# Patient Record
Sex: FEMALE | Race: ASIAN | NOT HISPANIC OR LATINO | URBAN - METROPOLITAN AREA
[De-identification: names, ages, dates, MRNs, and addresses within clinical notes are randomized per-mention and may not be internally consistent; named-entity substitution may affect disease eponyms.]

---

## 2023-06-27 ENCOUNTER — EMERGENCY (EMERGENCY)
Facility: HOSPITAL | Age: 43
LOS: 1 days | Discharge: HOME | End: 2023-06-27
Attending: STUDENT IN AN ORGANIZED HEALTH CARE EDUCATION/TRAINING PROGRAM
Payer: COMMERCIAL

## 2023-06-27 VITALS
SYSTOLIC BLOOD PRESSURE: 124 MMHG | RESPIRATION RATE: 20 BRPM | OXYGEN SATURATION: 98 % | HEART RATE: 83 BPM | TEMPERATURE: 98 F | DIASTOLIC BLOOD PRESSURE: 79 MMHG | WEIGHT: 203.93 LBS | HEIGHT: 63 IN

## 2023-06-27 VITALS
RESPIRATION RATE: 18 BRPM | OXYGEN SATURATION: 100 % | HEART RATE: 78 BPM | DIASTOLIC BLOOD PRESSURE: 73 MMHG | SYSTOLIC BLOOD PRESSURE: 129 MMHG | TEMPERATURE: 98 F

## 2023-06-27 LAB
ALBUMIN SERPL ELPH-MCNC: 4.1 G/DL — SIGNIFICANT CHANGE UP (ref 3.3–5)
ALP SERPL-CCNC: 43 U/L — SIGNIFICANT CHANGE UP (ref 40–120)
ALT FLD-CCNC: 10 U/L — SIGNIFICANT CHANGE UP (ref 10–45)
ANION GAP SERPL CALC-SCNC: 14 MMOL/L — SIGNIFICANT CHANGE UP (ref 5–17)
AST SERPL-CCNC: 12 U/L — SIGNIFICANT CHANGE UP (ref 10–40)
BASE EXCESS BLDV CALC-SCNC: -2.2 MMOL/L — LOW (ref -2–3)
BASOPHILS # BLD AUTO: 0.02 K/UL — SIGNIFICANT CHANGE UP (ref 0–0.2)
BASOPHILS NFR BLD AUTO: 0.2 % — SIGNIFICANT CHANGE UP (ref 0–2)
BILIRUB SERPL-MCNC: 0.3 MG/DL — SIGNIFICANT CHANGE UP (ref 0.2–1.2)
BUN SERPL-MCNC: 6 MG/DL — LOW (ref 7–23)
CA-I SERPL-SCNC: 1.32 MMOL/L — SIGNIFICANT CHANGE UP (ref 1.15–1.33)
CALCIUM SERPL-MCNC: 9.7 MG/DL — SIGNIFICANT CHANGE UP (ref 8.4–10.5)
CHLORIDE BLDV-SCNC: 105 MMOL/L — SIGNIFICANT CHANGE UP (ref 96–108)
CHLORIDE SERPL-SCNC: 104 MMOL/L — SIGNIFICANT CHANGE UP (ref 96–108)
CO2 BLDV-SCNC: 24 MMOL/L — SIGNIFICANT CHANGE UP (ref 22–26)
CO2 SERPL-SCNC: 20 MMOL/L — LOW (ref 22–31)
CREAT SERPL-MCNC: 0.4 MG/DL — LOW (ref 0.5–1.3)
EGFR: 126 ML/MIN/1.73M2 — SIGNIFICANT CHANGE UP
EOSINOPHIL # BLD AUTO: 0.04 K/UL — SIGNIFICANT CHANGE UP (ref 0–0.5)
EOSINOPHIL NFR BLD AUTO: 0.4 % — SIGNIFICANT CHANGE UP (ref 0–6)
GAS PNL BLDV: 135 MMOL/L — LOW (ref 136–145)
GAS PNL BLDV: SIGNIFICANT CHANGE UP
GLUCOSE BLDV-MCNC: 95 MG/DL — SIGNIFICANT CHANGE UP (ref 70–99)
GLUCOSE SERPL-MCNC: 96 MG/DL — SIGNIFICANT CHANGE UP (ref 70–99)
HCO3 BLDV-SCNC: 23 MMOL/L — SIGNIFICANT CHANGE UP (ref 22–29)
HCT VFR BLD CALC: 34.7 % — SIGNIFICANT CHANGE UP (ref 34.5–45)
HCT VFR BLDA CALC: 36 % — SIGNIFICANT CHANGE UP (ref 34.5–46.5)
HGB BLD CALC-MCNC: 12 G/DL — SIGNIFICANT CHANGE UP (ref 11.7–16.1)
HGB BLD-MCNC: 11.6 G/DL — SIGNIFICANT CHANGE UP (ref 11.5–15.5)
IMM GRANULOCYTES NFR BLD AUTO: 0.5 % — SIGNIFICANT CHANGE UP (ref 0–0.9)
LACTATE BLDV-MCNC: 1.7 MMOL/L — SIGNIFICANT CHANGE UP (ref 0.5–2)
LIDOCAIN IGE QN: 13 U/L — SIGNIFICANT CHANGE UP (ref 7–60)
LYMPHOCYTES # BLD AUTO: 1.94 K/UL — SIGNIFICANT CHANGE UP (ref 1–3.3)
LYMPHOCYTES # BLD AUTO: 21 % — SIGNIFICANT CHANGE UP (ref 13–44)
MCHC RBC-ENTMCNC: 30.8 PG — SIGNIFICANT CHANGE UP (ref 27–34)
MCHC RBC-ENTMCNC: 33.4 GM/DL — SIGNIFICANT CHANGE UP (ref 32–36)
MCV RBC AUTO: 92 FL — SIGNIFICANT CHANGE UP (ref 80–100)
MONOCYTES # BLD AUTO: 0.5 K/UL — SIGNIFICANT CHANGE UP (ref 0–0.9)
MONOCYTES NFR BLD AUTO: 5.4 % — SIGNIFICANT CHANGE UP (ref 2–14)
NEUTROPHILS # BLD AUTO: 6.67 K/UL — SIGNIFICANT CHANGE UP (ref 1.8–7.4)
NEUTROPHILS NFR BLD AUTO: 72.5 % — SIGNIFICANT CHANGE UP (ref 43–77)
NRBC # BLD: 0 /100 WBCS — SIGNIFICANT CHANGE UP (ref 0–0)
PCO2 BLDV: 41 MMHG — SIGNIFICANT CHANGE UP (ref 39–42)
PH BLDV: 7.36 — SIGNIFICANT CHANGE UP (ref 7.32–7.43)
PLATELET # BLD AUTO: 261 K/UL — SIGNIFICANT CHANGE UP (ref 150–400)
PO2 BLDV: 30 MMHG — SIGNIFICANT CHANGE UP (ref 25–45)
POTASSIUM BLDV-SCNC: 3.9 MMOL/L — SIGNIFICANT CHANGE UP (ref 3.5–5.1)
POTASSIUM SERPL-MCNC: 3.9 MMOL/L — SIGNIFICANT CHANGE UP (ref 3.5–5.3)
POTASSIUM SERPL-SCNC: 3.9 MMOL/L — SIGNIFICANT CHANGE UP (ref 3.5–5.3)
PROT SERPL-MCNC: 6.7 G/DL — SIGNIFICANT CHANGE UP (ref 6–8.3)
RBC # BLD: 3.77 M/UL — LOW (ref 3.8–5.2)
RBC # FLD: 13.2 % — SIGNIFICANT CHANGE UP (ref 10.3–14.5)
SAO2 % BLDV: 42.3 % — LOW (ref 67–88)
SODIUM SERPL-SCNC: 138 MMOL/L — SIGNIFICANT CHANGE UP (ref 135–145)
TROPONIN T, HIGH SENSITIVITY RESULT: <6 NG/L — SIGNIFICANT CHANGE UP (ref 0–51)
WBC # BLD: 9.22 K/UL — SIGNIFICANT CHANGE UP (ref 3.8–10.5)
WBC # FLD AUTO: 9.22 K/UL — SIGNIFICANT CHANGE UP (ref 3.8–10.5)

## 2023-06-27 PROCEDURE — 84132 ASSAY OF SERUM POTASSIUM: CPT

## 2023-06-27 PROCEDURE — 99285 EMERGENCY DEPT VISIT HI MDM: CPT | Mod: 25

## 2023-06-27 PROCEDURE — 85018 HEMOGLOBIN: CPT

## 2023-06-27 PROCEDURE — 80053 COMPREHEN METABOLIC PANEL: CPT

## 2023-06-27 PROCEDURE — 85014 HEMATOCRIT: CPT

## 2023-06-27 PROCEDURE — 82435 ASSAY OF BLOOD CHLORIDE: CPT

## 2023-06-27 PROCEDURE — 96374 THER/PROPH/DIAG INJ IV PUSH: CPT

## 2023-06-27 PROCEDURE — 82330 ASSAY OF CALCIUM: CPT

## 2023-06-27 PROCEDURE — 85025 COMPLETE CBC W/AUTO DIFF WBC: CPT

## 2023-06-27 PROCEDURE — 82803 BLOOD GASES ANY COMBINATION: CPT

## 2023-06-27 PROCEDURE — 71045 X-RAY EXAM CHEST 1 VIEW: CPT | Mod: 26

## 2023-06-27 PROCEDURE — 71045 X-RAY EXAM CHEST 1 VIEW: CPT

## 2023-06-27 PROCEDURE — 76705 ECHO EXAM OF ABDOMEN: CPT | Mod: 26

## 2023-06-27 PROCEDURE — 76705 ECHO EXAM OF ABDOMEN: CPT

## 2023-06-27 PROCEDURE — 82947 ASSAY GLUCOSE BLOOD QUANT: CPT

## 2023-06-27 PROCEDURE — 83605 ASSAY OF LACTIC ACID: CPT

## 2023-06-27 PROCEDURE — 84484 ASSAY OF TROPONIN QUANT: CPT

## 2023-06-27 PROCEDURE — 84295 ASSAY OF SERUM SODIUM: CPT

## 2023-06-27 PROCEDURE — 96375 TX/PRO/DX INJ NEW DRUG ADDON: CPT

## 2023-06-27 PROCEDURE — 93005 ELECTROCARDIOGRAM TRACING: CPT

## 2023-06-27 PROCEDURE — 99285 EMERGENCY DEPT VISIT HI MDM: CPT

## 2023-06-27 PROCEDURE — 83690 ASSAY OF LIPASE: CPT

## 2023-06-27 RX ORDER — SODIUM CHLORIDE 9 MG/ML
1000 INJECTION INTRAMUSCULAR; INTRAVENOUS; SUBCUTANEOUS ONCE
Refills: 0 | Status: COMPLETED | OUTPATIENT
Start: 2023-06-27 | End: 2023-06-27

## 2023-06-27 RX ORDER — FAMOTIDINE 10 MG/ML
20 INJECTION INTRAVENOUS ONCE
Refills: 0 | Status: COMPLETED | OUTPATIENT
Start: 2023-06-27 | End: 2023-06-27

## 2023-06-27 RX ORDER — ACETAMINOPHEN 500 MG
1000 TABLET ORAL ONCE
Refills: 0 | Status: COMPLETED | OUTPATIENT
Start: 2023-06-27 | End: 2023-06-27

## 2023-06-27 RX ORDER — LIDOCAINE 4 G/100G
10 CREAM TOPICAL ONCE
Refills: 0 | Status: COMPLETED | OUTPATIENT
Start: 2023-06-27 | End: 2023-06-27

## 2023-06-27 RX ADMIN — SODIUM CHLORIDE 1000 MILLILITER(S): 9 INJECTION INTRAMUSCULAR; INTRAVENOUS; SUBCUTANEOUS at 22:14

## 2023-06-27 RX ADMIN — LIDOCAINE 10 MILLILITER(S): 4 CREAM TOPICAL at 23:10

## 2023-06-27 RX ADMIN — FAMOTIDINE 20 MILLIGRAM(S): 10 INJECTION INTRAVENOUS at 22:10

## 2023-06-27 RX ADMIN — Medication 30 MILLILITER(S): at 22:10

## 2023-06-27 RX ADMIN — Medication 400 MILLIGRAM(S): at 23:46

## 2023-06-27 NOTE — ED PROVIDER NOTE - RAPID ASSESSMENT
MD Fxo:  patient seen in triage/quick-look encounter to expedite medical workup.  H&P is NOT complete, and will defer final HPI, testing, workup, and MDM to treating team in main ED.    42-year-old female 12 weeks pregnant complaining of 3 days intermittent substernal chest pain.  Denies associated shortness of breath, + subjective fevers.  No dysuria no flank pain no nausea no vomiting.  Medical history significant for diabetes and hypertension.  VS: wnl.  Gen: Adult female, uncomfortable appearing..  Head:  NC/AT.  Resp: No distress.  Ext: no deformities.  Skin: warm and dry as visualized.  Neuro: alert and oriented to person, place, time.  Abdomen: Soft nondistended, zero epigastric right upper quadrant tenderness to palpation  Medical decision making: Differential diagnosis at this time includes ACS, hepatobiliary disease.  Suspicion for pulmonary embolism is low at this time.  ddimer not indicated at this time.   ECG directly visualized by me shows normal sinus rhythm rate 78, , QRS 76, QTc 424 no ST elevations or depressions

## 2023-06-27 NOTE — ED PROVIDER NOTE - NSFOLLOWUPINSTRUCTIONS_ED_ALL_ED_FT
Abdominal Pain    Many things can cause abdominal pain. Many times, abdominal pain is not caused by a disease and will improve without treatment. Your health care provider will do a physical exam to determine if there is a dangerous cause of your pain; blood tests and imaging may help determine the cause of your pain. However, in many cases, no cause may be found and you may need further testing as an outpatient. Monitor your abdominal pain for any changes.     SEEK IMMEDIATE MEDICAL CARE IF YOU HAVE ANY OF THE FOLLOWING SYMPTOMS: worsening abdominal pain, uncontrollable vomiting, profuse diarrhea, inability to have bowel movements or pass gas, black or bloody stools, fever accompanying chest pain or back pain, or fainting. These symptoms may represent a serious problem that is an emergency. Do not wait to see if the symptoms will go away. Get medical help right away. Call 911 and do not drive yourself to the hospital.    GERD (Gastroesophageal Reflux Disease)    WHAT YOU NEED TO KNOW:    Gastroesophageal reflux disease (GERD) is reflux that happens more than 2 times a week for a few weeks. Reflux means acid and food in your stomach back up into your esophagus. GERD can cause other health problems over time if it is not treated.    Take omeprazole 20 mg once a day before dinner, take this medication for at least 14 days to trial its efficacy.    Take Maalox 10-20ml up to 4 times a day for severe pain.    Return to the emergency department if:   •You have trouble breathing after you vomit.  •You have trouble swallowing, or pain with swallowing.  •Your bowel movements are black, bloody, or tarry-looking.  •Your vomit looks like coffee grounds or has blood in it.    Call your doctor or gastroenterologist if:   •You feel full and cannot burp or vomit.  •You vomit large amounts, or you vomit often.  •You are losing weight without trying.  •Your symptoms get worse or do not improve with treatment.  •You have questions or concerns about your condition or care.    Medicines:   •Medicines are used to decrease stomach acid.  •Take your medicine as directed. Contact your healthcare provider if you think your medicine is not helping or if you have side effects. Tell him of her if you are allergic to any medicine. Keep a list of the medicines, vitamins, and herbs you take. Include the amounts, and when and why you take them. Bring the list or the pill bottles to follow-up visits. Carry your medicine list with you in case of an emergency.    Do not have foods or drinks that may increase heartburn. These include chocolate, peppermint, fried or fatty foods, drinks that contain caffeine, or carbonated drinks (soda). Other foods include spicy foods, onions, tomatoes, and tomato-based foods. Do not have foods or drinks that can irritate your esophagus, such as citrus fruits, juices, and alcohol.  •Do not eat large meals. When you eat a lot of food at one time, your stomach needs more acid to digest it. Eat 6 small meals each day instead of 3 large ones, and eat slowly. Do not eat meals 2 to 3 hours before bedtime.  •Elevate the head of your bed. You may also use more than one pillow under your head and shoulders while you sleep.  •Maintain a healthy weight. If you are overweight, weight loss may help relieve symptoms of GERD.      •Do not smoke. Smoking weakens the lower esophageal sphincter and increases the risk of GERD. Ask your healthcare provider for information if you currently smoke and need help to quit. E-cigarettes or smokeless tobacco still contain nicotine. Talk to your healthcare provider before you use these products.    •Do not put pressure on your abdomen. Pressure pushes acid up into your esophagus. Do not wear clothing that is tight around your waist. Do not bend over. Bend at the knees if you need to pick something up.    Follow up with your doctor or gastroenterologist as directed: Write down your questions so you remember to ask them during your visits.    Followup with your primary gynecologist within the next week.

## 2023-06-27 NOTE — ED ADULT NURSE NOTE - OBJECTIVE STATEMENT
Pt 41 y/o female, AxOX3, presents to ED from home complaining of sharp, epigastric pain x 3 days. PMH of HTN, DM, 12 weeks pregnant. Pt reporting pain is worse after eating. Pt is well appearing, speaking full sentences without difficulty. Breathing spontaneous and unlabored. Upon assessment, abdomen soft and nontender, +strong peripheral pulses, moving all extremities without difficulty, lungs clear. Pt placed on continuous pulse ox and cardiac monitor, NSR noted. Safety and comfort measures initiated- bed placed in lowest position and side rails raised. Pt oriented to call bell system.

## 2023-06-27 NOTE — ED PROVIDER NOTE - PHYSICAL EXAMINATION
A&Ox3, NAD  Lungs CTAB  Cardiac  RRR,   Abd soft, NT/ND, +BS, no rebound or guarding.   Extremities: cap refill <2, pulses in distal extremities 4+, no edema.   Skin without rash.   No focal Deficits, Gait steady.

## 2023-06-27 NOTE — ED ADULT TRIAGE NOTE - CHIEF COMPLAINT QUOTE
Pt c/o "chest pain radiating to back x few weeks. Worsened since yesterday. It comes every few days. 12 wks pregnant"

## 2023-06-27 NOTE — ED PROVIDER NOTE - OBJECTIVE STATEMENT
41 yo f with htn, DM, 12 weeks pregnant here with sharp epigastric pain radiating to R shoulder for the past 2-3 days. Pain worse after PO intake and after burping. Pt states pain was especially bad after eating lunch this afternoon, she took Mylanta and famotidine without improvement, she called her OB who recommended she be evaluated in the ED and evaluate her gallbladder. Pt denies sob or difficulty breathing, no leg pain or swelling, no hx od DVT/PE, no recent travelling. No fevers, no abdominal pain, no vaginal bleeding, no vomiting, diarrhea.

## 2023-06-27 NOTE — ED PROVIDER NOTE - PATIENT PORTAL LINK FT
You can access the FollowMyHealth Patient Portal offered by [facility] by registering at the following website: [email or web address]. By joining [facility]’s FollowMyHealth portal, you will also be able to view your health information using other applications (apps) compatible with our system.

## 2023-06-27 NOTE — ED PROVIDER NOTE - CLINICAL SUMMARY MEDICAL DECISION MAKING FREE TEXT BOX
Theo Dacosta MD. 41 yo f with htn, DM, 12 weeks pregnant here with sharp epigastric pain radiating to R shoulder for the past 2-3 days. Pain worse after PO intake and after burping. Pt states pain was especially bad after eating lunch this afternoon, she took Mylanta and famotidine without improvement, she called her OB who recommended she be evaluated in the ED and evaluate her gallbladder. Pt denies sob or difficulty breathing, no leg pain or swelling, no hx od DVT/PE, no recent travelling. No fevers, no abdominal pain, no vaginal bleeding, no vomiting, diarrhea.    VSS. general: in no acute distress. normocephalic, atrumatic head. neck supple. no jvd. rrr nl s1/s2, no m/r/g. lungs cta. abd soft non distended. mild ttp to epigastric region. no ruq ttp. no hubbard's sign. b/l le no edema or ttp. palpable radial and dp/pt pulses, equal. A&ox3. neurologically intact, no focal deficits. skin warm and dry.    mdm: ddx includes but not limited to pancreatitis, gastritis/GERD, biliary colic. lower suspicion for acute ACS or PE. lower suspicion for acute cholecystitis. no lower abd ttp to indicate appy.  on POCUS. Will check CBC to eval WBC, anemia, plt count; CMP to eval for lyte abnormalities, renal and/or liver dysfunction. lipase. gi cocktail. willl obtain RUQ sono to eval the above. will reassess. dispo pending ED w/u

## 2023-06-27 NOTE — ED ADULT TRIAGE NOTE - WILL THE PATIENT ACCEPT THE PFIZER COVID-19 VACCINE IF ELIGIBLE AND IT IS AVAILABLE?
Physical Therapy Daily Progress Note    Patient: Roderick Mcdonnell   : 1960  Diagnosis/ICD-10 Code:  Status post total left knee replacement [Z96.652]  Referring practitioner: Servando Gee MD  Date of Initial Visit: Type: THERAPY  Noted: 3/11/2020  Today's Date: 4/3/2020  Patient seen for 8 sessions           Subjective I walked for 30 min and forgot my cane at home     Objective   See Exercise, Manual, and Modality Logs for complete treatment.       Assessment/Plan  Left knee AROM after exercise  degrees and PROM 6-107 degrees is improved yet stiff in both directions. He does compensate stiffness with increased hip and trunk AROM.  He lacks quadriceps strength to stand from chair without hand support, is able to now step up for regular height of step but not down.            Timed:    Manual Therapy:    0     mins  74894;  Therapeutic Exercise:    50     mins  35931;     Neuromuscular Talia:    0    mins  94836;    Therapeutic Activity:     0     mins  97571;     Gait Trainin     mins  21744;     Ultrasound:     0     mins  10643;    Electrical Stimulation:    0     mins  42953 ( );  Iontophoresis    0     mins 41072;  Aquatic Therapy    0     mins 34112;  Dry Needling              0     mins    Untimed:  Electrical Stimulation:    0     mins  26359 ( );  Mechanical Traction:    0     mins  24034;     Timed Treatment:   50   mins   Total Treatment:     50   mins  Na Vitale, PT  Physical Therapist  
No

## 2023-06-27 NOTE — ED PROVIDER NOTE - ATTENDING APP SHARED VISIT CONTRIBUTION OF CARE
I, Theo Dacosta, performed a history and physical exam of the patient and discussed their management with the resident and/or advanced care provider. I reviewed the resident and/or advanced care provider's note and agree with the documented findings and plan of care except where noted. I was present and available for all procedures.    see mdm

## 2023-07-17 DIAGNOSIS — Z34.90 ENCOUNTER FOR SUPERVISION OF NORMAL PREGNANCY, UNSPECIFIED, UNSPECIFIED TRIMESTER: ICD-10-CM

## 2023-07-17 PROBLEM — Z00.00 ENCOUNTER FOR PREVENTIVE HEALTH EXAMINATION: Status: ACTIVE | Noted: 2023-07-17

## 2023-07-19 ENCOUNTER — TRANSCRIPTION ENCOUNTER (OUTPATIENT)
Age: 43
End: 2023-07-19

## 2023-07-19 ENCOUNTER — APPOINTMENT (OUTPATIENT)
Dept: CARDIOLOGY | Facility: CLINIC | Age: 43
End: 2023-07-19
Payer: COMMERCIAL

## 2023-07-19 PROCEDURE — 93306 TTE W/DOPPLER COMPLETE: CPT

## 2023-07-24 ENCOUNTER — LABORATORY RESULT (OUTPATIENT)
Age: 43
End: 2023-07-24

## 2023-07-24 ENCOUNTER — ASOB RESULT (OUTPATIENT)
Age: 43
End: 2023-07-24

## 2023-07-24 ENCOUNTER — APPOINTMENT (OUTPATIENT)
Dept: ANTEPARTUM | Facility: CLINIC | Age: 43
End: 2023-07-24
Payer: COMMERCIAL

## 2023-07-24 ENCOUNTER — APPOINTMENT (OUTPATIENT)
Dept: MATERNAL FETAL MEDICINE | Facility: CLINIC | Age: 43
End: 2023-07-24
Payer: COMMERCIAL

## 2023-07-24 PROCEDURE — 59000 AMNIOCENTESIS DIAGNOSTIC: CPT

## 2023-07-24 PROCEDURE — 76946 ECHO GUIDE FOR AMNIOCENTESIS: CPT

## 2023-07-24 PROCEDURE — 76805 OB US >/= 14 WKS SNGL FETUS: CPT

## 2023-07-24 PROCEDURE — 99204 OFFICE O/P NEW MOD 45 MIN: CPT | Mod: 25

## 2023-07-24 PROCEDURE — ZZZZZ: CPT

## 2023-07-25 ENCOUNTER — APPOINTMENT (OUTPATIENT)
Dept: CARDIOLOGY | Facility: CLINIC | Age: 43
End: 2023-07-25
Payer: COMMERCIAL

## 2023-07-25 ENCOUNTER — NON-APPOINTMENT (OUTPATIENT)
Age: 43
End: 2023-07-25

## 2023-07-25 VITALS
BODY MASS INDEX: 35.61 KG/M2 | OXYGEN SATURATION: 98 % | HEIGHT: 63 IN | HEART RATE: 69 BPM | DIASTOLIC BLOOD PRESSURE: 73 MMHG | SYSTOLIC BLOOD PRESSURE: 112 MMHG | WEIGHT: 201 LBS

## 2023-07-25 DIAGNOSIS — Z80.42 FAMILY HISTORY OF MALIGNANT NEOPLASM OF PROSTATE: ICD-10-CM

## 2023-07-25 DIAGNOSIS — E11.9 TYPE 2 DIABETES MELLITUS W/OUT COMPLICATIONS: ICD-10-CM

## 2023-07-25 DIAGNOSIS — F41.9 ANXIETY DISORDER, UNSPECIFIED: ICD-10-CM

## 2023-07-25 DIAGNOSIS — E78.5 HYPERLIPIDEMIA, UNSPECIFIED: ICD-10-CM

## 2023-07-25 DIAGNOSIS — Z87.891 PERSONAL HISTORY OF NICOTINE DEPENDENCE: ICD-10-CM

## 2023-07-25 DIAGNOSIS — I10 ESSENTIAL (PRIMARY) HYPERTENSION: ICD-10-CM

## 2023-07-25 DIAGNOSIS — Z78.9 OTHER SPECIFIED HEALTH STATUS: ICD-10-CM

## 2023-07-25 DIAGNOSIS — Z82.49 FAMILY HISTORY OF ISCHEMIC HEART DISEASE AND OTHER DISEASES OF THE CIRCULATORY SYSTEM: ICD-10-CM

## 2023-07-25 DIAGNOSIS — Z83.3 FAMILY HISTORY OF DIABETES MELLITUS: ICD-10-CM

## 2023-07-25 DIAGNOSIS — Z83.42 FAMILY HISTORY OF FAMILIAL HYPERCHOLESTEROLEMIA: ICD-10-CM

## 2023-07-25 DIAGNOSIS — Z80.43 FAMILY HISTORY OF MALIGNANT NEOPLASM OF TESTIS: ICD-10-CM

## 2023-07-25 PROCEDURE — 93000 ELECTROCARDIOGRAM COMPLETE: CPT

## 2023-07-25 PROCEDURE — 99204 OFFICE O/P NEW MOD 45 MIN: CPT

## 2023-07-25 RX ORDER — PSEUDOEPHEDRINE HCL 30 MG
27-0.8 TABLET ORAL DAILY
Refills: 0 | Status: ACTIVE | COMMUNITY

## 2023-07-25 RX ORDER — METFORMIN HYDROCHLORIDE 1000 MG/1
1000 TABLET, COATED ORAL
Qty: 180 | Refills: 1 | Status: ACTIVE | COMMUNITY

## 2023-07-25 RX ORDER — ASPIRIN ENTERIC COATED TABLETS 81 MG 81 MG/1
81 TABLET, DELAYED RELEASE ORAL
Qty: 90 | Refills: 1 | Status: ACTIVE | COMMUNITY

## 2023-07-25 RX ORDER — ESCITALOPRAM OXALATE 5 MG/1
5 TABLET ORAL DAILY
Refills: 0 | Status: ACTIVE | COMMUNITY

## 2023-07-25 RX ORDER — LABETALOL HYDROCHLORIDE 200 MG/1
200 TABLET, FILM COATED ORAL
Qty: 60 | Refills: 0 | Status: ACTIVE | COMMUNITY

## 2023-07-25 RX ORDER — INSULIN DETEMIR 100 [IU]/ML
100 INJECTION, SOLUTION SUBCUTANEOUS
Refills: 0 | Status: ACTIVE | COMMUNITY

## 2023-07-25 NOTE — DISCUSSION/SUMMARY
[FreeTextEntry1] : The patient is a 42-year-old female DM, HTN, HLD 16.5 weeks gestation whose blood pressure is currently controlled\par #1 CV- normal ECHO and ECHO\par #2 HTN- c/w labetalol 200mg bid, parameters reviewed, email provided for easier communication\par #3 HLD- off atorvastatin for pregnancy\par #4 Ob- 16.5 weeks 2nd pregnancy, on aspirin, prenatal [EKG obtained to assist in diagnosis and management of assessed problem(s)] : EKG obtained to assist in diagnosis and management of assessed problem(s)

## 2023-07-25 NOTE — HISTORY OF PRESENT ILLNESS
[FreeTextEntry1] : Fanta is a 42-year-old female DM, HTN now 16.5 weeks gestation 2nd pregnancy. BP elevated. Started on labetalol 200mg bid a month ago since 140/80s. Now on aspirin as well. She has DM and on metformin and levemir.\par was on losartan and atorvastatin until pregnancy.Headaches during the day.

## 2023-08-22 ENCOUNTER — APPOINTMENT (OUTPATIENT)
Dept: PEDIATRIC CARDIOLOGY | Facility: CLINIC | Age: 43
End: 2023-08-22
Payer: COMMERCIAL

## 2023-08-22 PROCEDURE — 76820 UMBILICAL ARTERY ECHO: CPT

## 2023-08-22 PROCEDURE — 93325 DOPPLER ECHO COLOR FLOW MAPG: CPT | Mod: 59

## 2023-08-22 PROCEDURE — 99202 OFFICE O/P NEW SF 15 MIN: CPT

## 2023-08-22 PROCEDURE — 76825 ECHO EXAM OF FETAL HEART: CPT

## 2023-08-22 PROCEDURE — 76827 ECHO EXAM OF FETAL HEART: CPT

## 2023-08-31 ENCOUNTER — APPOINTMENT (OUTPATIENT)
Dept: CARDIOLOGY | Facility: CLINIC | Age: 43
End: 2023-08-31

## 2023-09-11 ENCOUNTER — OUTPATIENT (OUTPATIENT)
Dept: OUTPATIENT SERVICES | Facility: HOSPITAL | Age: 43
LOS: 1 days | End: 2023-09-11
Payer: COMMERCIAL

## 2023-09-11 VITALS — OXYGEN SATURATION: 98 % | HEART RATE: 78 BPM

## 2023-09-11 VITALS — DIASTOLIC BLOOD PRESSURE: 67 MMHG | HEART RATE: 70 BPM | SYSTOLIC BLOOD PRESSURE: 133 MMHG | TEMPERATURE: 98 F

## 2023-09-11 DIAGNOSIS — O26.899 OTHER SPECIFIED PREGNANCY RELATED CONDITIONS, UNSPECIFIED TRIMESTER: ICD-10-CM

## 2023-09-11 LAB
ALBUMIN SERPL ELPH-MCNC: 4 G/DL — SIGNIFICANT CHANGE UP (ref 3.3–5)
ALP SERPL-CCNC: 64 U/L — SIGNIFICANT CHANGE UP (ref 40–120)
ALT FLD-CCNC: 11 U/L — SIGNIFICANT CHANGE UP (ref 10–45)
ANION GAP SERPL CALC-SCNC: 15 MMOL/L — SIGNIFICANT CHANGE UP (ref 5–17)
APPEARANCE UR: CLEAR — SIGNIFICANT CHANGE UP
APTT BLD: 26.9 SEC — SIGNIFICANT CHANGE UP (ref 24.5–35.6)
AST SERPL-CCNC: 13 U/L — SIGNIFICANT CHANGE UP (ref 10–40)
BASOPHILS # BLD AUTO: 0.02 K/UL — SIGNIFICANT CHANGE UP (ref 0–0.2)
BASOPHILS NFR BLD AUTO: 0.2 % — SIGNIFICANT CHANGE UP (ref 0–2)
BILIRUB SERPL-MCNC: 0.1 MG/DL — LOW (ref 0.2–1.2)
BILIRUB UR-MCNC: NEGATIVE — SIGNIFICANT CHANGE UP
BLD GP AB SCN SERPL QL: NEGATIVE — SIGNIFICANT CHANGE UP
BUN SERPL-MCNC: 12 MG/DL — SIGNIFICANT CHANGE UP (ref 7–23)
CALCIUM SERPL-MCNC: 10.2 MG/DL — SIGNIFICANT CHANGE UP (ref 8.4–10.5)
CHLORIDE SERPL-SCNC: 104 MMOL/L — SIGNIFICANT CHANGE UP (ref 96–108)
CO2 SERPL-SCNC: 18 MMOL/L — LOW (ref 22–31)
COLOR SPEC: SIGNIFICANT CHANGE UP
CREAT ?TM UR-MCNC: 71 MG/DL — SIGNIFICANT CHANGE UP
CREAT SERPL-MCNC: 0.51 MG/DL — SIGNIFICANT CHANGE UP (ref 0.5–1.3)
DIFF PNL FLD: ABNORMAL
EGFR: 119 ML/MIN/1.73M2 — SIGNIFICANT CHANGE UP
EOSINOPHIL # BLD AUTO: 0.03 K/UL — SIGNIFICANT CHANGE UP (ref 0–0.5)
EOSINOPHIL NFR BLD AUTO: 0.4 % — SIGNIFICANT CHANGE UP (ref 0–6)
FIBRINOGEN PPP-MCNC: 325 MG/DL — SIGNIFICANT CHANGE UP (ref 200–445)
GLUCOSE BLDC GLUCOMTR-MCNC: 108 MG/DL — HIGH (ref 70–99)
GLUCOSE SERPL-MCNC: 108 MG/DL — HIGH (ref 70–99)
GLUCOSE UR QL: NEGATIVE — SIGNIFICANT CHANGE UP
HCT VFR BLD CALC: 33.9 % — LOW (ref 34.5–45)
HGB BLD-MCNC: 11.3 G/DL — LOW (ref 11.5–15.5)
IMM GRANULOCYTES NFR BLD AUTO: 0.5 % — SIGNIFICANT CHANGE UP (ref 0–0.9)
INR BLD: 0.93 RATIO — SIGNIFICANT CHANGE UP (ref 0.85–1.18)
KETONES UR-MCNC: ABNORMAL
LDH SERPL L TO P-CCNC: 124 U/L — SIGNIFICANT CHANGE UP (ref 50–242)
LEUKOCYTE ESTERASE UR-ACNC: NEGATIVE — SIGNIFICANT CHANGE UP
LYMPHOCYTES # BLD AUTO: 2.21 K/UL — SIGNIFICANT CHANGE UP (ref 1–3.3)
LYMPHOCYTES # BLD AUTO: 26.8 % — SIGNIFICANT CHANGE UP (ref 13–44)
MCHC RBC-ENTMCNC: 29.5 PG — SIGNIFICANT CHANGE UP (ref 27–34)
MCHC RBC-ENTMCNC: 33.3 GM/DL — SIGNIFICANT CHANGE UP (ref 32–36)
MCV RBC AUTO: 88.5 FL — SIGNIFICANT CHANGE UP (ref 80–100)
MONOCYTES # BLD AUTO: 0.47 K/UL — SIGNIFICANT CHANGE UP (ref 0–0.9)
MONOCYTES NFR BLD AUTO: 5.7 % — SIGNIFICANT CHANGE UP (ref 2–14)
NEUTROPHILS # BLD AUTO: 5.47 K/UL — SIGNIFICANT CHANGE UP (ref 1.8–7.4)
NEUTROPHILS NFR BLD AUTO: 66.4 % — SIGNIFICANT CHANGE UP (ref 43–77)
NITRITE UR-MCNC: NEGATIVE — SIGNIFICANT CHANGE UP
NRBC # BLD: 0 /100 WBCS — SIGNIFICANT CHANGE UP (ref 0–0)
PH UR: 6.5 — SIGNIFICANT CHANGE UP (ref 5–8)
PLATELET # BLD AUTO: 258 K/UL — SIGNIFICANT CHANGE UP (ref 150–400)
POTASSIUM SERPL-MCNC: 4 MMOL/L — SIGNIFICANT CHANGE UP (ref 3.5–5.3)
POTASSIUM SERPL-SCNC: 4 MMOL/L — SIGNIFICANT CHANGE UP (ref 3.5–5.3)
PROT ?TM UR-MCNC: <7 MG/DL — SIGNIFICANT CHANGE UP (ref 0–12)
PROT SERPL-MCNC: 6.5 G/DL — SIGNIFICANT CHANGE UP (ref 6–8.3)
PROT UR-MCNC: NEGATIVE — SIGNIFICANT CHANGE UP
PROT/CREAT UR-RTO: <0.1 RATIO — SIGNIFICANT CHANGE UP (ref 0–0.2)
PROTHROM AB SERPL-ACNC: 9.8 SEC — SIGNIFICANT CHANGE UP (ref 9.5–13)
RBC # BLD: 3.83 M/UL — SIGNIFICANT CHANGE UP (ref 3.8–5.2)
RBC # FLD: 14.2 % — SIGNIFICANT CHANGE UP (ref 10.3–14.5)
RH IG SCN BLD-IMP: POSITIVE — SIGNIFICANT CHANGE UP
SODIUM SERPL-SCNC: 137 MMOL/L — SIGNIFICANT CHANGE UP (ref 135–145)
SP GR SPEC: 1.02 — SIGNIFICANT CHANGE UP (ref 1.01–1.02)
URATE SERPL-MCNC: 4.6 MG/DL — SIGNIFICANT CHANGE UP (ref 2.5–7)
UROBILINOGEN FLD QL: NEGATIVE — SIGNIFICANT CHANGE UP
WBC # BLD: 8.24 K/UL — SIGNIFICANT CHANGE UP (ref 3.8–10.5)
WBC # FLD AUTO: 8.24 K/UL — SIGNIFICANT CHANGE UP (ref 3.8–10.5)

## 2023-09-11 PROCEDURE — 80053 COMPREHEN METABOLIC PANEL: CPT

## 2023-09-11 PROCEDURE — 83615 LACTATE (LD) (LDH) ENZYME: CPT

## 2023-09-11 PROCEDURE — 85384 FIBRINOGEN ACTIVITY: CPT

## 2023-09-11 PROCEDURE — 82570 ASSAY OF URINE CREATININE: CPT

## 2023-09-11 PROCEDURE — G0463: CPT

## 2023-09-11 PROCEDURE — 84156 ASSAY OF PROTEIN URINE: CPT

## 2023-09-11 PROCEDURE — 86780 TREPONEMA PALLIDUM: CPT

## 2023-09-11 PROCEDURE — 85730 THROMBOPLASTIN TIME PARTIAL: CPT

## 2023-09-11 PROCEDURE — 86900 BLOOD TYPING SEROLOGIC ABO: CPT

## 2023-09-11 PROCEDURE — 82962 GLUCOSE BLOOD TEST: CPT

## 2023-09-11 PROCEDURE — 86038 ANTINUCLEAR ANTIBODIES: CPT

## 2023-09-11 PROCEDURE — 86850 RBC ANTIBODY SCREEN: CPT

## 2023-09-11 PROCEDURE — 87591 N.GONORRHOEAE DNA AMP PROB: CPT

## 2023-09-11 PROCEDURE — 81001 URINALYSIS AUTO W/SCOPE: CPT

## 2023-09-11 PROCEDURE — 86146 BETA-2 GLYCOPROTEIN ANTIBODY: CPT

## 2023-09-11 PROCEDURE — 86901 BLOOD TYPING SEROLOGIC RH(D): CPT

## 2023-09-11 PROCEDURE — 84550 ASSAY OF BLOOD/URIC ACID: CPT

## 2023-09-11 PROCEDURE — 85610 PROTHROMBIN TIME: CPT

## 2023-09-11 PROCEDURE — 85025 COMPLETE CBC W/AUTO DIFF WBC: CPT

## 2023-09-11 RX ORDER — CITRIC ACID/SODIUM CITRATE 300-500 MG
15 SOLUTION, ORAL ORAL EVERY 6 HOURS
Refills: 0 | Status: DISCONTINUED | OUTPATIENT
Start: 2023-09-11 | End: 2023-09-11

## 2023-09-11 RX ORDER — CHLORHEXIDINE GLUCONATE 213 G/1000ML
1 SOLUTION TOPICAL DAILY
Refills: 0 | Status: DISCONTINUED | OUTPATIENT
Start: 2023-09-11 | End: 2023-09-11

## 2023-09-11 RX ORDER — SODIUM CHLORIDE 9 MG/ML
1000 INJECTION INTRAMUSCULAR; INTRAVENOUS; SUBCUTANEOUS
Refills: 0 | Status: DISCONTINUED | OUTPATIENT
Start: 2023-09-11 | End: 2023-09-11

## 2023-09-11 RX ORDER — OXYTOCIN 10 UNIT/ML
333.33 VIAL (ML) INJECTION
Qty: 20 | Refills: 0 | Status: DISCONTINUED | OUTPATIENT
Start: 2023-09-11 | End: 2023-09-11

## 2023-09-11 RX ORDER — SODIUM CHLORIDE 9 MG/ML
1000 INJECTION, SOLUTION INTRAVENOUS
Refills: 0 | Status: DISCONTINUED | OUTPATIENT
Start: 2023-09-11 | End: 2023-09-11

## 2023-09-11 NOTE — OB PROVIDER TRIAGE NOTE - NSOBPROVIDERNOTE_OBGYN_ALL_OB_FT
Assessment  42y  yoF G P  @ 23w6d presents for IUFD diagnosed in the office today. Concern for possible sPEC in setting of hx of cHTN and elevated BPs at home.    Pt asymptomatic and normotensive throughout triage observation and HELLP labs wnl with exception of coags which are pending.   Patient declines IOL and desires D&E. She is aware that the fetus does not deliver intact.  Per discussion - patient declines genetics and autopsy.     Plan  - If coags wnl and BPs remain normal ->> Discharge to home  - Pt to be scheduled for Family Planning consult outpatient     D/w Dr. Mervat Galo-Mckenna PGY3 Assessment  42y  yoF G P  @ 23w6d presents for IUFD diagnosed in the office today. Concern for possible siPEC in setting of hx of cHTN and elevated BPs at home.    Pt asymptomatic and normotensive throughout triage observation and HELLP labs wnl with exception of coags which are pending.   Patient declines IOL and desires D&E. She is aware that the fetus does not deliver intact.  Per discussion - patient declines genetics and autopsy.     Plan  - If coags wnl and BPs remain normal ->> Discharge to home  - Pt to be scheduled for Family Planning consult outpatient     D/w Dr. Mervat Galo-Mckenna PGY3

## 2023-09-11 NOTE — OB RN TRIAGE NOTE - CHIEF COMPLAINT QUOTE
"at [facility] I told them I haven't been feeling FM and they told me there is no fetal heart rate"

## 2023-09-11 NOTE — OB RN TRIAGE NOTE - NSNURSINGINSTR_OBGYN_ALL_OB_FT
PT to go home and await call from DR Celaya's office for appointment tomorrow or Wednesday, and procedure Wed or Thursday.  Call Dr Hendricks's office if no one contacts you tomorrow or you break bag of water, heavy vaginal bleeding, regular contractions, worsening headache, blurry vision, seeing spots, epigastric pain, or BP increases.

## 2023-09-11 NOTE — OB RN TRIAGE NOTE - NS_OBGYNHISTORY_OBGYN_ALL_OB_FT
12/30/20 c/s-male failed induction of labor, inc BP.   2022 on BP medication   This preg inc BP now on Lebatalol 400mg qam and qpm. GDM on Levimere 16u qam and 34u qpm, Humulin 6u before dinner, Metformin 1000mg qam and qpm. Top x2 12/30/20 c/s-male failed induction of labor, inc BP.   2022 on BP medication   This preg inc BP now on Lebatalol 400mg qam and qpm. GDM on Levimere 16u qam and 34u qpm, Humulin 6u before dinner, Metformin 1000mg qam and qpm.  Nasal polyp removed

## 2023-09-11 NOTE — OB RN TRIAGE NOTE - FALL HARM RISK - UNIVERSAL INTERVENTIONS
Bed in lowest position, wheels locked, appropriate side rails in place/Call bell, personal items and telephone in reach/Instruct patient to call for assistance before getting out of bed or chair/Non-slip footwear when patient is out of bed/[place] to call system/Physically safe environment - no spills, clutter or unnecessary equipment/Purposeful Proactive Rounding/Room/bathroom lighting operational, light cord in reach

## 2023-09-11 NOTE — OB PROVIDER TRIAGE NOTE - NS ATTEND AMEND GEN_ALL_CORE FT
Pt seen and examined by me at bedside. IUFD confirmed [facility] outpatient. Pt desires D&E. Low c/f siPEC at this time. All HELLP labs normal. BP monitoring for 4 hours, all stable. FS normal. Patient cleared for d/c and to followup with Family Planning this week for booking.    Keith Crowell MD

## 2023-09-11 NOTE — OB PROVIDER TRIAGE NOTE - NS_OBGYNHISTORY_OBGYN_ALL_OB_FT
Top x2 12/30/20 c/s-male failed induction of labor, inc BP.   2022 on BP medication   This preg inc BP now on Lebatalol 400mg qam and qpm. GDM on Levimere 16u qam and 34u qpm, Humulin 6u before dinner, Metformin 1000mg qam and qpm.  Nasal polyp removed

## 2023-09-11 NOTE — OB PROVIDER TRIAGE NOTE - HISTORY OF PRESENT ILLNESS
R3 Triage H&P    IRIS JEFFERY  [id number]    Subjective  HPI: 41yo F  @23w6d sent in from office after diagnosis of IUFD today. Pt here for evaluation for rule out sPEC in setting of cHTN and elevated BPs at home. Pt reports that she originally felt "fluttering" movements 2-3 weeks ago but stopped feeling them last week.  She also reports an occasional tension HA that improves with Tylenol. Denies changes in vision, blurry vision, RUQ pain, SOB, chest pain, epigastric pain, nausea, vomiting, LE swelling. Her BPs at home have been 140s/80s in the past few weeks and her BP medication uptritrated to Labetalol 400 BID from 200 BID 1 week ago. Reports her BPs did not improve with the uptitration. Currently denies HA. Denies LOF , ctx, VB. Pt denies any other concerns.  This is a spontaneous pregnancy.  S/p Amniocentesis with normal results per patient.       PNC: cHTN, T2DM  ObHx: CS  for cat 2 at 37w c/b cHTN, A2  GynHx: Denies hx of fibroids, ovarian cysts, abnml PAP smears, STIs  MedHx: Denies hx of HTN, DM, asthma, thyroid problems, blood clots/bleeding problems, hx of blood transfusions  Meds: PNV, ASA 81mg QD, Labetolol 400 BID (increased from 200 BID last week), Levemir 16u qAM/ 32u qhs, ADmelog 6u w/ dinner, Metformin 1000 BID, Escitalopram 10mg qhs  All: NKDA  PSHx: CSx1, D&Cx2, nasal polyp removal   FHx: Denies hx of blood clots/bleeding problems  Social: 15yr history of smoking - quit last year. Denies alcohol/tobacco/drug use in pregnancy  Psych: Anxiety , denies depression.     – Will accept blood transfusions? Yes    Objective  – VS  T(C): 36.7 (23 @ 16:58)  HR: 79 (23 @ 18:55)  BP: 129/75 (23 @ 18:55)  RR: 18 (23 @ 16:58)  SpO2: 98% (23 @ 18:55)    – PE:   CV: RRR  Pulm: breathing comfortably on RA  Abd: gravid, nontender  Extr: moving all extremities with ease    FS:  108

## 2023-09-11 NOTE — OB RN TRIAGE NOTE - NS_DISCHARGEPRINT_OBGYN_ALL_OB
Patient's first and last name, , procedure, and correct site confirmed prior to the start of procedure.  OB Discharge Instructions

## 2023-09-12 ENCOUNTER — APPOINTMENT (OUTPATIENT)
Dept: OBGYN | Facility: CLINIC | Age: 43
End: 2023-09-12
Payer: COMMERCIAL

## 2023-09-12 VITALS
WEIGHT: 204.13 LBS | BODY MASS INDEX: 36.17 KG/M2 | HEIGHT: 63 IN | DIASTOLIC BLOOD PRESSURE: 74 MMHG | SYSTOLIC BLOOD PRESSURE: 111 MMHG

## 2023-09-12 LAB
B2 GLYCOPROT1 AB SER QL: NEGATIVE — SIGNIFICANT CHANGE UP
N GONORRHOEA RRNA SPEC QL NAA+PROBE: SIGNIFICANT CHANGE UP
PROT ?TM UR-MCNC: 5 MG/DL — SIGNIFICANT CHANGE UP (ref 0–12)
SPECIMEN SOURCE: SIGNIFICANT CHANGE UP
T PALLIDUM AB TITR SER: NEGATIVE — SIGNIFICANT CHANGE UP

## 2023-09-12 PROCEDURE — 99215 OFFICE O/P EST HI 40 MIN: CPT

## 2023-09-12 PROCEDURE — 99024 POSTOP FOLLOW-UP VISIT: CPT

## 2023-09-12 PROCEDURE — 76816 OB US FOLLOW-UP PER FETUS: CPT | Mod: 26

## 2023-09-12 PROCEDURE — ZZZZZ: CPT

## 2023-09-13 ENCOUNTER — TRANSCRIPTION ENCOUNTER (OUTPATIENT)
Age: 43
End: 2023-09-13

## 2023-09-13 ENCOUNTER — OUTPATIENT (OUTPATIENT)
Dept: OUTPATIENT SERVICES | Facility: HOSPITAL | Age: 43
LOS: 1 days | End: 2023-09-13
Payer: COMMERCIAL

## 2023-09-13 ENCOUNTER — APPOINTMENT (OUTPATIENT)
Dept: OBGYN | Facility: CLINIC | Age: 43
End: 2023-09-13
Payer: COMMERCIAL

## 2023-09-13 ENCOUNTER — NON-APPOINTMENT (OUTPATIENT)
Age: 43
End: 2023-09-13

## 2023-09-13 VITALS
WEIGHT: 204 LBS | HEIGHT: 63 IN | HEART RATE: 80 BPM | DIASTOLIC BLOOD PRESSURE: 79 MMHG | SYSTOLIC BLOOD PRESSURE: 121 MMHG | BODY MASS INDEX: 36.14 KG/M2

## 2023-09-13 VITALS
OXYGEN SATURATION: 99 % | HEART RATE: 64 BPM | TEMPERATURE: 98 F | DIASTOLIC BLOOD PRESSURE: 80 MMHG | WEIGHT: 203.93 LBS | RESPIRATION RATE: 16 BRPM | HEIGHT: 64 IN | SYSTOLIC BLOOD PRESSURE: 150 MMHG

## 2023-09-13 DIAGNOSIS — O34.219 MATERNAL CARE FOR UNSPECIFIED TYPE SCAR FROM PREVIOUS CESAREAN DELIVERY: ICD-10-CM

## 2023-09-13 DIAGNOSIS — O36.4XX0 MATERNAL CARE FOR INTRAUTERINE DEATH, NOT APPLICABLE OR UNSPECIFIED: ICD-10-CM

## 2023-09-13 DIAGNOSIS — O24.419 GESTATIONAL DIABETES MELLITUS IN PREGNANCY, UNSPECIFIED CONTROL: ICD-10-CM

## 2023-09-13 DIAGNOSIS — Z98.890 OTHER SPECIFIED POSTPROCEDURAL STATES: Chronic | ICD-10-CM

## 2023-09-13 DIAGNOSIS — Z98.891 HISTORY OF UTERINE SCAR FROM PREVIOUS SURGERY: Chronic | ICD-10-CM

## 2023-09-13 DIAGNOSIS — Z3A.23 23 WEEKS GESTATION OF PREGNANCY: ICD-10-CM

## 2023-09-13 DIAGNOSIS — I10 ESSENTIAL (PRIMARY) HYPERTENSION: ICD-10-CM

## 2023-09-13 DIAGNOSIS — O99.342 OTHER MENTAL DISORDERS COMPLICATING PREGNANCY, SECOND TRIMESTER: ICD-10-CM

## 2023-09-13 DIAGNOSIS — O10.912 UNSPECIFIED PRE-EXISTING HYPERTENSION COMPLICATING PREGNANCY, SECOND TRIMESTER: ICD-10-CM

## 2023-09-13 DIAGNOSIS — O09.522 SUPERVISION OF ELDERLY MULTIGRAVIDA, SECOND TRIMESTER: ICD-10-CM

## 2023-09-13 DIAGNOSIS — E11.9 TYPE 2 DIABETES MELLITUS WITHOUT COMPLICATIONS: ICD-10-CM

## 2023-09-13 DIAGNOSIS — F41.9 ANXIETY DISORDER, UNSPECIFIED: ICD-10-CM

## 2023-09-13 LAB
A1C WITH ESTIMATED AVERAGE GLUCOSE RESULT: 5.5 % — SIGNIFICANT CHANGE UP (ref 4–5.6)
ANA TITR SER: NEGATIVE — SIGNIFICANT CHANGE UP
ANION GAP SERPL CALC-SCNC: 13 MMOL/L — SIGNIFICANT CHANGE UP (ref 7–14)
APTT BLD: 27.3 SEC — SIGNIFICANT CHANGE UP (ref 24.5–35.6)
BLD GP AB SCN SERPL QL: NEGATIVE — SIGNIFICANT CHANGE UP
BUN SERPL-MCNC: 10 MG/DL — SIGNIFICANT CHANGE UP (ref 7–23)
C TRACH RRNA SPEC QL NAA+PROBE: NOT DETECTED
CALCIUM SERPL-MCNC: 9.2 MG/DL — SIGNIFICANT CHANGE UP (ref 8.4–10.5)
CHLORIDE SERPL-SCNC: 105 MMOL/L — SIGNIFICANT CHANGE UP (ref 98–107)
CO2 SERPL-SCNC: 17 MMOL/L — LOW (ref 22–31)
CREAT SERPL-MCNC: 0.36 MG/DL — LOW (ref 0.5–1.3)
EGFR: 129 ML/MIN/1.73M2 — SIGNIFICANT CHANGE UP
ESTIMATED AVERAGE GLUCOSE: 111 — SIGNIFICANT CHANGE UP
FIBRINOGEN PPP-MCNC: 293 MG/DL — SIGNIFICANT CHANGE UP (ref 200–465)
GLUCOSE SERPL-MCNC: 80 MG/DL — SIGNIFICANT CHANGE UP (ref 70–99)
HCT VFR BLD CALC: 35.1 % — SIGNIFICANT CHANGE UP (ref 34.5–45)
HGB BLD-MCNC: 12 G/DL — SIGNIFICANT CHANGE UP (ref 11.5–15.5)
INR BLD: 0.91 RATIO — SIGNIFICANT CHANGE UP (ref 0.85–1.18)
MCHC RBC-ENTMCNC: 30 PG — SIGNIFICANT CHANGE UP (ref 27–34)
MCHC RBC-ENTMCNC: 34.2 GM/DL — SIGNIFICANT CHANGE UP (ref 32–36)
MCV RBC AUTO: 87.8 FL — SIGNIFICANT CHANGE UP (ref 80–100)
N GONORRHOEA RRNA SPEC QL NAA+PROBE: NOT DETECTED
NRBC # BLD: 0 /100 WBCS — SIGNIFICANT CHANGE UP (ref 0–0)
NRBC # FLD: 0 K/UL — SIGNIFICANT CHANGE UP (ref 0–0)
PLATELET # BLD AUTO: 275 K/UL — SIGNIFICANT CHANGE UP (ref 150–400)
POTASSIUM SERPL-MCNC: 4.5 MMOL/L — SIGNIFICANT CHANGE UP (ref 3.5–5.3)
POTASSIUM SERPL-SCNC: 4.5 MMOL/L — SIGNIFICANT CHANGE UP (ref 3.5–5.3)
PROTHROM AB SERPL-ACNC: 10.3 SEC — SIGNIFICANT CHANGE UP (ref 9.5–13)
RBC # BLD: 4 M/UL — SIGNIFICANT CHANGE UP (ref 3.8–5.2)
RBC # FLD: 14 % — SIGNIFICANT CHANGE UP (ref 10.3–14.5)
RH IG SCN BLD-IMP: POSITIVE — SIGNIFICANT CHANGE UP
SODIUM SERPL-SCNC: 135 MMOL/L — SIGNIFICANT CHANGE UP (ref 135–145)
SOURCE AMPLIFICATION: NORMAL
WBC # BLD: 7.72 K/UL — SIGNIFICANT CHANGE UP (ref 3.8–10.5)
WBC # FLD AUTO: 7.72 K/UL — SIGNIFICANT CHANGE UP (ref 3.8–10.5)

## 2023-09-13 PROCEDURE — 59200 INSERT CERVICAL DILATOR: CPT

## 2023-09-13 PROCEDURE — 88291 CYTO/MOLECULAR REPORT: CPT

## 2023-09-13 PROCEDURE — 99213 OFFICE O/P EST LOW 20 MIN: CPT | Mod: 25

## 2023-09-13 PROCEDURE — 76815 OB US LIMITED FETUS(S): CPT

## 2023-09-13 RX ORDER — DEXTROSE 50 % IN WATER 50 %
25 SYRINGE (ML) INTRAVENOUS ONCE
Refills: 0 | Status: DISCONTINUED | OUTPATIENT
Start: 2023-09-14 | End: 2023-09-28

## 2023-09-13 RX ORDER — GLUCAGON INJECTION, SOLUTION 0.5 MG/.1ML
1 INJECTION, SOLUTION SUBCUTANEOUS ONCE
Refills: 0 | Status: DISCONTINUED | OUTPATIENT
Start: 2023-09-14 | End: 2023-09-28

## 2023-09-13 RX ORDER — ONDANSETRON 4 MG/1
4 TABLET ORAL EVERY 6 HOURS
Qty: 6 | Refills: 0 | Status: ACTIVE | COMMUNITY
Start: 2023-09-13 | End: 1900-01-01

## 2023-09-13 RX ORDER — MISOPROSTOL 200 UG/1
200 TABLET ORAL
Qty: 2 | Refills: 0 | Status: ACTIVE | COMMUNITY
Start: 2023-09-13 | End: 1900-01-01

## 2023-09-13 RX ORDER — DEXTROSE 50 % IN WATER 50 %
12.5 SYRINGE (ML) INTRAVENOUS ONCE
Refills: 0 | Status: DISCONTINUED | OUTPATIENT
Start: 2023-09-14 | End: 2023-09-28

## 2023-09-13 RX ORDER — OXYCODONE AND ACETAMINOPHEN 5; 325 MG/1; MG/1
5-325 TABLET ORAL
Qty: 6 | Refills: 0 | Status: ACTIVE | COMMUNITY
Start: 2023-09-13 | End: 1900-01-01

## 2023-09-13 RX ORDER — DEXTROSE 50 % IN WATER 50 %
15 SYRINGE (ML) INTRAVENOUS ONCE
Refills: 0 | Status: DISCONTINUED | OUTPATIENT
Start: 2023-09-14 | End: 2023-09-28

## 2023-09-13 RX ORDER — AZITHROMYCIN 250 MG/1
250 TABLET, FILM COATED ORAL
Qty: 4 | Refills: 0 | Status: ACTIVE | COMMUNITY
Start: 2023-09-13 | End: 1900-01-01

## 2023-09-13 RX ORDER — SODIUM CHLORIDE 9 MG/ML
1000 INJECTION, SOLUTION INTRAVENOUS
Refills: 0 | Status: DISCONTINUED | OUTPATIENT
Start: 2023-09-14 | End: 2023-09-28

## 2023-09-13 NOTE — H&P PST ADULT - PROBLEM SELECTOR PLAN 2
Patient instructed to hold Metformin the morning of procedure. Pt stated understanding.  Patient instructed to take only 25 units of Levemir the night before surgery and take only 8 units of Insulin Levemir the morning of surgery. Patient verbalized understanding.

## 2023-09-13 NOTE — H&P PST ADULT - PROBLEM SELECTOR PLAN 3
Patient instructed to take labetalol with a sip of water on the morning of procedure. Patient verbalized understanding.

## 2023-09-13 NOTE — H&P PST ADULT - GENITOURINARY
Plan: Clear demarcation of where erythema ends on abdomen—below xyphoid process associated with urticarial like patches on chest and bilateral inner arms.\\n\\nMometasone bid x 1 week. Then decrease to \\nTAC oint bid x 1 weeks. After applying moisturer\\nGentle skin care \\nEmollient Bid Cetaphil. Samples given Detail Level: Zone Samples Given: CeraVe Plan: Patient is pregnant \\nDiscussed diphenhydramine safety in pregnancy, however patient does not like drowsiness with this med\\nAdvised patient to discuss other options with GYN if needed (e.g.  second generation antihistamine like Zyrtec or Claritin) details…

## 2023-09-13 NOTE — H&P PST ADULT - HISTORY OF PRESENT ILLNESS
42 year old female 23 week pregnant  with PMH of HTN, HLD, Type 2 DM, Anxiety presents to Presurgical testing with diagnosis of Maternal care for Intrauterine death  scheduled for dilation and evacuation with ultrasound guidance

## 2023-09-13 NOTE — H&P PST ADULT - NS MD HP INPLANTS MED DEV
0745  Bedside, Verbal and Written shift change report given to Nadine Hidalgo RN (oncoming nurse) by Renaldo Burger RN (offgoing nurse). Report included the following information SBAR, Kardex, Intake/Output, MAR, Recent Results and Med Rec Status. 0409  Pt BP still elevated. No new orders at this time. 2335  Pt BP rising to 169/101. Pt asymptomatic and stated the pain in abdomen is better with simethicone. Dr. Adair Mitchell called. No new orders at this time except to call if systolic goes above 588. Will continue to monitor. 2101  Pt complaining of gas and bloating. Dr. Adair Mitchell stated to order simethicone 80 mg 4 times daily as needed. Will continue to monitor. None

## 2023-09-13 NOTE — H&P PST ADULT - GASTROINTESTINAL
details… normal/soft/nontender/nondistended/normal active bowel sounds gravid abdomen/normal active bowel sounds

## 2023-09-13 NOTE — H&P PST ADULT - NSICDXFAMILYHX_GEN_ALL_CORE_FT
FAMILY HISTORY:  Father  Still living? Unknown  FH: CAD (coronary artery disease), Age at diagnosis: Age Unknown  FHx: prostate cancer, Age at diagnosis: Age Unknown    Sibling  Still living? Unknown  FHx: testicular cancer, Age at diagnosis: Age Unknown

## 2023-09-13 NOTE — H&P PST ADULT - PROBLEM SELECTOR PLAN 1
Patient tentatively scheduled for dilation and evacuation with ultrasound guidance on 9/14/23.  Pre-op instructions provided. Pt given verbal and written instructions with teach back on pepcid. Pt verbalized understanding with return demonstration.  MADHU precautions,

## 2023-09-14 ENCOUNTER — APPOINTMENT (OUTPATIENT)
Dept: OBGYN | Facility: CLINIC | Age: 43
End: 2023-09-14

## 2023-09-14 ENCOUNTER — TRANSCRIPTION ENCOUNTER (OUTPATIENT)
Age: 43
End: 2023-09-14

## 2023-09-14 ENCOUNTER — RESULT REVIEW (OUTPATIENT)
Age: 43
End: 2023-09-14

## 2023-09-14 ENCOUNTER — OUTPATIENT (OUTPATIENT)
Dept: OUTPATIENT SERVICES | Facility: HOSPITAL | Age: 43
LOS: 1 days | Discharge: HOME | End: 2023-09-14
Payer: COMMERCIAL

## 2023-09-14 VITALS
HEART RATE: 76 BPM | RESPIRATION RATE: 16 BRPM | DIASTOLIC BLOOD PRESSURE: 78 MMHG | OXYGEN SATURATION: 99 % | HEIGHT: 64 IN | WEIGHT: 203.93 LBS | TEMPERATURE: 98 F | SYSTOLIC BLOOD PRESSURE: 121 MMHG

## 2023-09-14 VITALS
HEART RATE: 70 BPM | DIASTOLIC BLOOD PRESSURE: 80 MMHG | OXYGEN SATURATION: 99 % | RESPIRATION RATE: 14 BRPM | TEMPERATURE: 98 F | SYSTOLIC BLOOD PRESSURE: 130 MMHG

## 2023-09-14 DIAGNOSIS — O36.4XX0 MATERNAL CARE FOR INTRAUTERINE DEATH, NOT APPLICABLE OR UNSPECIFIED: ICD-10-CM

## 2023-09-14 DIAGNOSIS — Z98.890 OTHER SPECIFIED POSTPROCEDURAL STATES: Chronic | ICD-10-CM

## 2023-09-14 DIAGNOSIS — Z98.891 HISTORY OF UTERINE SCAR FROM PREVIOUS SURGERY: Chronic | ICD-10-CM

## 2023-09-14 LAB
APTT 2H P 1:4 NP PPP: NORMAL
APTT 2H P INC PPP: NORMAL
APTT IMM NP/PRE NP PPP: NORMAL
APTT INV RATIO PPP: 28.5 SEC
BLD GP AB SCN SERPL QL: NEGATIVE — SIGNIFICANT CHANGE UP
GLUCOSE BLDC GLUCOMTR-MCNC: 88 MG/DL — SIGNIFICANT CHANGE UP (ref 70–99)
NPP NORMAL POOLED PLASMA: NORMAL SECS
RH IG SCN BLD-IMP: POSITIVE — SIGNIFICANT CHANGE UP

## 2023-09-14 PROCEDURE — 76998 US GUIDE INTRAOP: CPT | Mod: 26

## 2023-09-14 PROCEDURE — 88305 TISSUE EXAM BY PATHOLOGIST: CPT | Mod: 26

## 2023-09-14 PROCEDURE — 59821 TREATMENT OF MISCARRIAGE: CPT | Mod: 22

## 2023-09-14 RX ORDER — IBUPROFEN 200 MG
1 TABLET ORAL
Qty: 0 | Refills: 0 | DISCHARGE

## 2023-09-14 RX ORDER — INSULIN LISPRO 100/ML
8 VIAL (ML) SUBCUTANEOUS
Refills: 0 | DISCHARGE

## 2023-09-14 RX ORDER — ASPIRIN/CALCIUM CARB/MAGNESIUM 324 MG
1 TABLET ORAL
Refills: 0 | DISCHARGE

## 2023-09-14 RX ORDER — ACETAMINOPHEN 500 MG
2 TABLET ORAL
Qty: 0 | Refills: 0 | DISCHARGE

## 2023-09-14 RX ORDER — IBUPROFEN 600 MG/1
600 TABLET, FILM COATED ORAL
Qty: 6 | Refills: 0 | Status: ACTIVE | COMMUNITY
Start: 2023-09-14 | End: 1900-01-01

## 2023-09-14 RX ORDER — INSULIN DETEMIR 100/ML (3)
16 INSULIN PEN (ML) SUBCUTANEOUS
Refills: 0 | DISCHARGE

## 2023-09-14 RX ORDER — ESCITALOPRAM OXALATE 10 MG/1
1 TABLET, FILM COATED ORAL
Refills: 0 | DISCHARGE

## 2023-09-14 RX ORDER — INSULIN DETEMIR 100/ML (3)
32 INSULIN PEN (ML) SUBCUTANEOUS
Refills: 0 | DISCHARGE

## 2023-09-14 RX ORDER — LABETALOL HCL 100 MG
400 TABLET ORAL
Refills: 0 | DISCHARGE

## 2023-09-14 RX ORDER — METFORMIN HYDROCHLORIDE 850 MG/1
1 TABLET ORAL
Refills: 0 | DISCHARGE

## 2023-09-14 RX ORDER — DIPHENOXYLATE HCL/ATROPINE 2.5-.025MG
2 TABLET ORAL ONCE
Refills: 0 | Status: DISCONTINUED | OUTPATIENT
Start: 2023-09-14 | End: 2023-09-14

## 2023-09-14 RX ADMIN — Medication 2 TABLET(S): at 09:06

## 2023-09-14 NOTE — ASU DISCHARGE PLAN (ADULT/PEDIATRIC) - CARE PROVIDER_API CALL
Pratima Celaya  Obstetrics and Gynecology  [address], [address]  [place] [address]  Phone: [phone]  Fax: [phone]  Follow Up Time: 2 weeks

## 2023-09-14 NOTE — BRIEF OPERATIVE NOTE - NSICDXBRIEFPROCEDURE_GEN_ALL_CORE_FT
PROCEDURES:  Treatment, missed , surgical, second trimester 14-Sep-2023 08:52:15 1. EUA  2. removal of laminaria (6)  3. standard dilation and evacuation under ultrasound guidance Pratima Celaya

## 2023-09-14 NOTE — ASU DISCHARGE PLAN (ADULT/PEDIATRIC) - NURSING INSTRUCTIONS
You were given intravenous TYLENOL for pain management at  7.45 am. Please DO NOT take any products containing TYLENOL or ACETAMINOPHEN, such as VICODIN, PERCOCET, EXCEDRIN, and any over-the-counter cold medication for the next 6 hours (until  1.45 pm. DO NOT TAKE MORE THAN 3000 MG OF TYLENOL in a 24 hour period. You were given intravenous TORADOL for pain management at  8.15 am Please DO NOT take ibuprofen containing products such as MOTRIN or ADVIL, or any other NSAIDs (Non-Steroidal Anti-Inflammatory Drugs) such as ALEVE for the next 6 hours (until  2.15 pm

## 2023-09-14 NOTE — BRIEF OPERATIVE NOTE - NSICDXBRIEFPREOP_GEN_ALL_CORE_FT
PRE-OP DIAGNOSIS:  Fetal demise, greater than 22 weeks, antepartum 14-Sep-2023 08:52:39 1. IUP @ 23 2/7 weeks  2. IUFD Pratima Celaya   PRE-OP DIAGNOSIS:  Fetal demise, greater than 22 weeks, antepartum 14-Sep-2023 08:52:39 1. IUP @ 24 2/7 weeks  2. IUFD Pratima Celaya

## 2023-09-14 NOTE — BRIEF OPERATIVE NOTE - NSICDXBRIEFPOSTOP_GEN_ALL_CORE_FT
POST-OP DIAGNOSIS:  Fetal demise, greater than 22 weeks, antepartum 14-Sep-2023 08:53:24 1. IUP @ 23 2/7 weeks  2. IUFD Pratima Celaya   POST-OP DIAGNOSIS:  Fetal demise, greater than 22 weeks, antepartum 14-Sep-2023 08:53:24 1. IUP @ 24 2/7 weeks  2. IUFD Pratima Celaya

## 2023-09-14 NOTE — BRIEF OPERATIVE NOTE - OPERATION/FINDINGS
anteverted uterus approximately 20 weeks size, exam limited secondary to body habitus.   removal of 6 dilators, cervix 1 cm dilated.  macerated fetus and placenta approximately 20 weeks size.   all fetal parts accounted for.  poor descent of cervix and uterus.  BT: A+ . monsels applied to tenaculum anteverted uterus approximately 20 weeks size, exam limited secondary to body habitus.   removal of 6 dilators, cervix 1 cm dilated.  TXA 1 g administered. macerated fetus and placenta approximately 20 weeks size.   all fetal parts accounted for.  poor descent of cervix and uterus. pitocin 30 units. BT: A+ . monsels applied to tenaculum site

## 2023-09-15 ENCOUNTER — NON-APPOINTMENT (OUTPATIENT)
Age: 43
End: 2023-09-15

## 2023-09-17 LAB — FIBRINOGEN AG PPP IA-MCNC: 233 MG/DL

## 2023-09-26 PROBLEM — F41.9 ANXIETY DISORDER, UNSPECIFIED: Chronic | Status: ACTIVE | Noted: 2023-09-13

## 2023-09-26 PROBLEM — E78.5 HYPERLIPIDEMIA, UNSPECIFIED: Chronic | Status: ACTIVE | Noted: 2023-09-13

## 2023-09-26 PROBLEM — I10 ESSENTIAL (PRIMARY) HYPERTENSION: Chronic | Status: ACTIVE | Noted: 2023-09-13

## 2023-09-26 PROBLEM — E11.9 TYPE 2 DIABETES MELLITUS WITHOUT COMPLICATIONS: Chronic | Status: ACTIVE | Noted: 2023-09-13

## 2023-09-27 ENCOUNTER — APPOINTMENT (OUTPATIENT)
Dept: OBGYN | Facility: CLINIC | Age: 43
End: 2023-09-27

## 2023-10-09 LAB — CHROM ANALY OVERALL INTERP SPEC-IMP: SIGNIFICANT CHANGE UP

## 2023-12-09 ENCOUNTER — NON-APPOINTMENT (OUTPATIENT)
Age: 43
End: 2023-12-09

## 2023-12-20 LAB — MISCELLANEOUS TEST: NORMAL

## 2024-01-19 LAB — SURGICAL PATHOLOGY STUDY: SIGNIFICANT CHANGE UP

## 2024-03-30 ENCOUNTER — NON-APPOINTMENT (OUTPATIENT)
Age: 44
End: 2024-03-30

## 2024-09-24 NOTE — H&P PST ADULT - NSICDXPASTMEDICALHX_GEN_ALL_CORE_FT
No
PAST MEDICAL HISTORY:  Anxiety     DM (diabetes mellitus)     HLD (hyperlipidemia)     HTN (hypertension)

## 2025-01-30 NOTE — ED ADULT TRIAGE NOTE - PRO INTERPRETER NEED 2
English [FreeTextEntry1] : Annual physical  [de-identified] : 55 year old female presents for an annual physical.   has HTN- on Amlodipine 5 mg daily  has Hyperlipidemia- on Atorvastatin 10 mg daily  takes Alprazolam PRN for anxiety which she finds to be helpful  requests refill   follows with Gastroenterology diagnosed with ulcerative colitis started on Mesalamine  last colonoscopy done in September 2024- showed left sided ulcerative colitis, internal hemorrhoids   has Hypothyroidism- on Synthroid 25 mcg daily  follows with endocrinology   has had urology evaluation for microscopic hematuria   reports chronic cough has allergies- on Levocetirizine   c/o pain to her right knee  c/o stiffness to both of her knees  c/o stiffness and joint pains to her hands   has stress due to her elderly father with advanced Parkinson's   agrees for a flu vaccine no fever

## 2025-03-14 NOTE — ASU PREOP CHECKLIST - BMI (KG/M2)
Outpatient Rehab    Speech-Language Pathology Visit    Patient Name: Virgil Taylor  MRN: [id number]  YOB: 2020  Encounter Date: 3/17/2025    Therapy Diagnosis:   Encounter Diagnoses   Name Primary?    Autism spectrum disorder Yes    Other symbolic dysfunctions     Articulation disorder      Physician: Kate Campbell NP    Physician Orders: Eval and Treat  Medical Diagnosis: Development delay    Visit # / Visits Authorized: 5 / 20   Date of Evaluation: 8/28/2024    Insurance Authorization Period: 1/1/2025 to 12/31/2025  Plan of Care Certification: 3/10/2025 to 9/10/2025       Time In: 1454   Time Out: 1528  Total Time: 34   Total Billable Time: 34 minutes     Subjective   Virgil appeared in a good mood upon entering today's session. Caregiver reported nothing new in regards to Virgil's speech and language skills at this time..    No pain behaviors observed/reports of pain.    Objective          The Clinical Evaluation of Language Fundamentals - 3rd edition (CELF-P) was administered on 2/10/2025 and completed on 3/17/2025 to assess Virgil's receptive and expressive language skills. Standard scores ranging between 7 and 13 are considered to be within the average range for subtests and standard scores ranging between 85 and 115 are considered to be within the average range for composite scores. He achieved the following scores so far:        Subtest Raw  Score Scaled  Score   Sentence Comprehension 17 10   Word Structure 11 7   Expressive Vocabulary 20 7   Following Directions 7 4   Recalling Sentences 25 8   Basic Concepts 18 6   Word Classes 8 5         Composite Scores    Sum of Scaled Scores Standard Score   Core Language Score 24 87   Receptive Language Index 19 77   Expressive Language Index 22 84   Language Content Index 16 69   Language Structure Index 25 89                     *All index scores have a mean of 100 and a standard deviation of 15     Core Language  35 Score:  The Core Language Score is a measure of general language ability and provides an easy and reliable way to quantify Virigl's overall language performance. Virgil achieved a Standard Score of 87. This score was in the low average range for his age level. The subtests within this index include: sentence comprehension (understand spoken sentences), word structure (word meanings and rules), and expressive vocabulary (labeling objects, people, and actions). Virgil displayed difficulties with the following: possessive nouns, verb tense, copula, pronoun, derivational form, and expressive vocabulary. *Note: Virgil has difficulty producing the /s/ phoneme which may have affected his score on the word structure task.      Receptive Language Index:  The Receptive Language Index is a measure of Virgil's performance on three subtests designed to assess receptive aspects of language including listening and auditory comprehension. Virgil achieved a Standard Score of 77. This score was in the below average range for his age level. The subtests with this index include: sentence comprehension (understand spoken sentences), following directions (understand and apply location, quality, and quantity concepts and single to multiple step commands), and word classes (knowledge of classes of words). Virgil displayed difficulties with the followin-level commands with one modifier, 1-level commands with two modifiers, 2-level commands, 3-level commands, basic concepts, and word classes     Expressive Language Index:  The Expressive Language Index is a measure of Virgil's performance on three subtests designed to assess expressive aspects of language including oral language expression. Virgil achieved a Standard Score of 22. This score was in the borderline low average range for his age level. The subtests within this index include: word structure (word meanings and grammatical rules), expressive vocabulary (labeling  objects, people, and actions), and recalling sentences (repeating a sentence). Virgil displayed difficulties with the following: possessive nouns, verb tense, copula, pronouns, derivational form, and variety of expressive vocabulary     Language Content Index:  The Language Content Index is a measure of Virgil's performance on three tests designed to assess various aspects of semantic development. Virgil achieved a Standard Score of 69. This score was in the below average range for his age level. The subtests within this index include: expressive vocabulary (labeling objects, people, and actions), following directions (understand and apply location, quality, and quantity concepts and single to multiple step commands), and word classes (knowledge of classes of words).   Virgil displayed difficulties with the followin-level commands with one modifier, 1-level commands with two modifiers, 2-level commands , 3-level commands, basic concepts, and word classes     Language Structure Index:  The Language Structure Index is a measure of Virgil's performance on three subtests designed to assess the understanding and use of language form. Virgil achieved a Standard Score of 89. This score was in the average range for his age level. The subtests within this index include: sentence comprehension (understand spoken sentences), word structure (word meanings and grammatical rules), and recalling sentences (repeating a sentence). Virgil displayed difficulties with the following: possessive nouns, verb tense, copula, pronouns, and derivational form    Time Entry(in minutes):  Speech Treatment (Individual) Time Entry: 34    Assessment & Plan   Assessment  Virgil is progressing towards his goals. Completed the remaining subtests in the Clinical Evaluation of Language Fundamentals -  3. Results reported under objective. Virgil participated well in today's testing given minimal to moderate cues for attention to  task.  Evaluation/Treatment Tolerance: Patient tolerated treatment well    Patient will continue to benefit from skilled outpatient speech therapy to address the deficits listed in the problem list box on initial evaluation, provide pt/family education and to maximize pt's level of independence in the home and community environment.     Patient's spiritual, cultural, and educational needs considered and patient agreeable to plan of care and goals.     Education  Education was done with Other recipient present and Patient. The patient's learning style includes Demonstration and Listening. The patient Demonstrates understanding and Verbalizes understanding. Mother participated in education. They identified as Parent. The reported learning style is Demonstration and Listening. The recipient Demonstrates understanding.            Plan  Continue plan of care 1x per week for 6 months to address the patient's language and articulation skills.        Goals:   Active       Long Term Goals       LTG: Patient will demonstrate age-appropriate receptive and expressive language skills, as based on informal and formal measures (Progressing)       Start:  03/10/25    Expected End:  09/10/25            LTG: Patient will demonstrate age-appropriate articulation skills, as based on informal and formal measures (Progressing)       Start:  03/10/25    Expected End:  09/10/25            LTG: Caregivers will demonstrate adequate implementation of HEP and therapeutic strategies to support language/articulation development   (Progressing)       Start:  03/10/25    Expected End:  09/10/25               Short Term Goals - Articulation       STG: Patient will produce final /g/, /k/, and /f/ at the word and phrase level with 80% accuracy across 3 consecutive sessions.  (Progressing)       Start:  03/10/25    Expected End:  06/10/25       New goal; did not target         STG: Patient will produce initial /v/ at the word, phrase, and sentence  level with 80% accuracy across 3 consecutive sessions.  (Progressing)       Start:  03/10/25    Expected End:  06/10/25       New goal; did not target         STG: Patient will produce /s/ at the phoneme and syllable level with 80% accuracy across 3 consecutive sessions.  (Progressing)       Start:  03/10/25    Expected End:  06/10/25       Did not target            Short Term Goals - Language       STG: Patient will participate in language testing to determine his current receptive/expressive language skills.  (Met)       Start:  03/10/25    Expected End:  06/10/25    Resolved:  03/17/25    3/17/2025 - Goal met! Completed the Clinical Evaluation of Language Fundamentals -  3. Results reported under objective.          STG: Patient will demonstrate correct use of possessives with 80% accuracy across 3 consecutive sessions.  (Progressing)       Start:  03/10/25    Expected End:  06/10/25       New goal; did not target         STG: Patient will demonstrate understanding of a variety of pronouns (she/her/they/etc.) with 80% accuracy across 3 consecutive sessions.  (Progressing)       Start:  03/10/25    Expected End:  06/10/25       New goal, did not target             Alejandra Oscar, CCC-SLP

## 2025-03-28 ENCOUNTER — APPOINTMENT (OUTPATIENT)
Dept: OTOLARYNGOLOGY | Facility: CLINIC | Age: 45
End: 2025-03-28

## 2025-03-28 VITALS
HEIGHT: 63 IN | SYSTOLIC BLOOD PRESSURE: 116 MMHG | BODY MASS INDEX: 25.87 KG/M2 | HEART RATE: 74 BPM | WEIGHT: 146 LBS | DIASTOLIC BLOOD PRESSURE: 78 MMHG

## 2025-03-28 DIAGNOSIS — I10 ESSENTIAL (PRIMARY) HYPERTENSION: ICD-10-CM

## 2025-03-28 DIAGNOSIS — Z87.39 PERSONAL HISTORY OF OTHER DISEASES OF THE MUSCULOSKELETAL SYSTEM AND CONNECTIVE TISSUE: ICD-10-CM

## 2025-03-28 DIAGNOSIS — J32.8 OTHER CHRONIC SINUSITIS: ICD-10-CM

## 2025-03-28 DIAGNOSIS — Z86.39 PERSONAL HISTORY OF OTHER ENDOCRINE, NUTRITIONAL AND METABOLIC DISEASE: ICD-10-CM

## 2025-03-28 DIAGNOSIS — E78.2 MIXED HYPERLIPIDEMIA: ICD-10-CM

## 2025-03-28 PROCEDURE — 31231 NASAL ENDOSCOPY DX: CPT

## 2025-03-28 PROCEDURE — 99203 OFFICE O/P NEW LOW 30 MIN: CPT | Mod: 25

## 2025-03-28 RX ORDER — LOSARTAN POTASSIUM 100 MG/1
TABLET, FILM COATED ORAL
Refills: 0 | Status: ACTIVE | COMMUNITY

## 2025-03-28 RX ORDER — TIRZEPATIDE 15 MG/.5ML
15 INJECTION, SOLUTION SUBCUTANEOUS
Refills: 0 | Status: ACTIVE | COMMUNITY

## 2025-03-28 RX ORDER — HYDROXYCHLOROQUINE SULFATE 200 MG/1
TABLET ORAL
Refills: 0 | Status: ACTIVE | COMMUNITY

## 2025-03-28 RX ORDER — ATORVASTATIN CALCIUM 80 MG/1
TABLET, FILM COATED ORAL
Refills: 0 | Status: ACTIVE | COMMUNITY

## 2025-04-04 PROBLEM — J32.8 OTHER CHRONIC SINUSITIS: Status: ACTIVE | Noted: 2025-04-04

## 2025-04-07 ENCOUNTER — APPOINTMENT (OUTPATIENT)
Dept: CT IMAGING | Facility: IMAGING CENTER | Age: 45
End: 2025-04-07
Payer: COMMERCIAL

## 2025-04-07 ENCOUNTER — OUTPATIENT (OUTPATIENT)
Dept: OUTPATIENT SERVICES | Facility: HOSPITAL | Age: 45
LOS: 1 days | End: 2025-04-07
Payer: COMMERCIAL

## 2025-04-07 DIAGNOSIS — Z98.890 OTHER SPECIFIED POSTPROCEDURAL STATES: Chronic | ICD-10-CM

## 2025-04-07 DIAGNOSIS — Z98.891 HISTORY OF UTERINE SCAR FROM PREVIOUS SURGERY: Chronic | ICD-10-CM

## 2025-04-07 DIAGNOSIS — J32.8 OTHER CHRONIC SINUSITIS: ICD-10-CM

## 2025-04-07 PROCEDURE — 70486 CT MAXILLOFACIAL W/O DYE: CPT

## 2025-04-07 PROCEDURE — 70486 CT MAXILLOFACIAL W/O DYE: CPT | Mod: 26

## 2025-05-06 ENCOUNTER — NON-APPOINTMENT (OUTPATIENT)
Age: 45
End: 2025-05-06

## 2025-05-08 ENCOUNTER — APPOINTMENT (OUTPATIENT)
Dept: OTOLARYNGOLOGY | Facility: CLINIC | Age: 45
End: 2025-05-08
Payer: COMMERCIAL

## 2025-05-08 VITALS — HEIGHT: 63 IN | WEIGHT: 146 LBS | BODY MASS INDEX: 25.87 KG/M2

## 2025-05-08 DIAGNOSIS — Z98.890 OTHER SPECIFIED POSTPROCEDURAL STATES: ICD-10-CM

## 2025-05-08 DIAGNOSIS — J32.8 OTHER CHRONIC SINUSITIS: ICD-10-CM

## 2025-05-08 PROCEDURE — 31231 NASAL ENDOSCOPY DX: CPT

## 2025-05-08 PROCEDURE — 99204 OFFICE O/P NEW MOD 45 MIN: CPT | Mod: 25

## 2025-05-08 RX ORDER — MOMETASONE FUROATE 100 %
POWDER (GRAM) MISCELLANEOUS
Qty: 1 | Refills: 3 | Status: ACTIVE | COMMUNITY
Start: 2025-05-08 | End: 1900-01-01

## 2025-06-23 ENCOUNTER — APPOINTMENT (OUTPATIENT)
Dept: OTOLARYNGOLOGY | Facility: HOSPITAL | Age: 45
End: 2025-06-23

## 2025-07-08 ENCOUNTER — APPOINTMENT (OUTPATIENT)
Dept: OTOLARYNGOLOGY | Facility: CLINIC | Age: 45
End: 2025-07-08

## 2025-08-05 ENCOUNTER — APPOINTMENT (OUTPATIENT)
Dept: OTOLARYNGOLOGY | Facility: CLINIC | Age: 45
End: 2025-08-05

## 2025-09-16 ENCOUNTER — APPOINTMENT (OUTPATIENT)
Dept: OTOLARYNGOLOGY | Facility: CLINIC | Age: 45
End: 2025-09-16

## (undated) DEVICE — DRAPE TOWEL BLUE 17" X 24"

## (undated) DEVICE — POSITIONER PINK PAD PIGAZZI SYSTEM

## (undated) DEVICE — GLV 7.5 PROTEXIS (WHITE)

## (undated) DEVICE — POSITIONER STRAP ARMBOARD VELCRO TS-30

## (undated) DEVICE — VISITEC 4X4

## (undated) DEVICE — PACK D&C

## (undated) DEVICE — DRSG PAD SANITARY OB

## (undated) DEVICE — BASIN SET SINGLE

## (undated) DEVICE — DRAPE LIGHT HANDLE COVER (GREEN)

## (undated) DEVICE — LABELS BLANK W PEN

## (undated) DEVICE — TUBING GYRUS ACMI COLLECTION SET 6FT

## (undated) DEVICE — VENODYNE/SCD SLEEVE CALF MEDIUM

## (undated) DEVICE — ELCTR BOVIE PENCIL SMOKE EVACUATION

## (undated) DEVICE — GOWN LG